# Patient Record
Sex: FEMALE | Race: WHITE | Employment: FULL TIME | URBAN - METROPOLITAN AREA
[De-identification: names, ages, dates, MRNs, and addresses within clinical notes are randomized per-mention and may not be internally consistent; named-entity substitution may affect disease eponyms.]

---

## 2023-10-04 ENCOUNTER — OFFICE VISIT (OUTPATIENT)
Age: 33
End: 2023-10-04
Payer: COMMERCIAL

## 2023-10-04 VITALS
DIASTOLIC BLOOD PRESSURE: 60 MMHG | TEMPERATURE: 97.6 F | SYSTOLIC BLOOD PRESSURE: 108 MMHG | HEART RATE: 74 BPM | RESPIRATION RATE: 20 BRPM | OXYGEN SATURATION: 98 % | BODY MASS INDEX: 20.8 KG/M2 | WEIGHT: 113 LBS | HEIGHT: 62 IN

## 2023-10-04 DIAGNOSIS — G43.719 INTRACTABLE CHRONIC MIGRAINE WITHOUT AURA AND WITHOUT STATUS MIGRAINOSUS: Primary | ICD-10-CM

## 2023-10-04 PROCEDURE — 99204 OFFICE O/P NEW MOD 45 MIN: CPT | Performed by: STUDENT IN AN ORGANIZED HEALTH CARE EDUCATION/TRAINING PROGRAM

## 2023-10-04 RX ORDER — BACLOFEN 5 MG/1
5 TABLET ORAL 2 TIMES DAILY
COMMUNITY
Start: 2023-09-12

## 2023-10-04 RX ORDER — NORGESTREL AND ETHINYL ESTRADIOL 0.3-0.03MG
KIT ORAL
COMMUNITY

## 2023-10-04 RX ORDER — RIZATRIPTAN BENZOATE 10 MG/1
TABLET ORAL
COMMUNITY
Start: 2023-02-03

## 2023-10-04 ASSESSMENT — ENCOUNTER SYMPTOMS
BACK PAIN: 0
SHORTNESS OF BREATH: 0
COUGH: 0
VOMITING: 0
NAUSEA: 1

## 2023-10-04 NOTE — PROGRESS NOTES
Margoth De La Rosa is a 29 y.o. female . presents for New Patient and Migraine    A 29years old female patient referred for evaluation of migraine and continuation of Botox injection. She used to get her Botox injection at [facility] neurology but her neurologist left the practice and referred here. Has been having headaches since around 2010. Had progressive increase in the frequency of her headache. Botox was helping. Used to have daily headaches; 5-8 severe headaches per month. But with the Botox injection, was getting 1-2 severe headaches per month. Her last Botox injection is more than 3 months ago; claims headaches are getting worse now. She described her headache as a bandlike pressure sensation starting from the frontal area then wraps around her head; then gradually involved this in the neck. Might be severe: 7-8/10. She also gets daily regular headaches which are mild. Has difficulty functioning when having the migraine-like headaches. Might have nausea and vomiting. Has photophobia but no phonophobia. Severe headaches might last for a couple of days. Takes rizatriptan which helps. She was also given baclofen which she takes as needed at nighttime; she claims it relaxes her. Had tried sumatriptan previously. Headache triggers include stress; no specific dietary triggers. She drinks alcohol over the weekends; mostly beer. It does not trigger headache. Clinical history includes mother with migraine headache with aura. Patient does not have any typical orders. Her past history of head trauma when she was in middle school: Used to do gymnastics and she fell from a balance beam; landed on her head. No loss of consciousness. She does not have any weakness of her extremities. No sensory symptoms over the upper or lower extremities. Other preventatives previously tried include Elavil. Was also on the blood pressure medicine for prevention but she does not remember the name.   MRI of the

## 2023-11-22 ENCOUNTER — PROCEDURE VISIT (OUTPATIENT)
Age: 33
End: 2023-11-22

## 2023-11-22 VITALS
DIASTOLIC BLOOD PRESSURE: 72 MMHG | SYSTOLIC BLOOD PRESSURE: 108 MMHG | TEMPERATURE: 98.3 F | RESPIRATION RATE: 20 BRPM | HEART RATE: 90 BPM | WEIGHT: 110.6 LBS | HEIGHT: 62 IN | BODY MASS INDEX: 20.35 KG/M2 | OXYGEN SATURATION: 98 %

## 2023-11-22 DIAGNOSIS — G43.719 INTRACTABLE CHRONIC MIGRAINE WITHOUT AURA AND WITHOUT STATUS MIGRAINOSUS: Primary | ICD-10-CM

## 2023-11-22 ASSESSMENT — ENCOUNTER SYMPTOMS
VOMITING: 0
BACK PAIN: 1
SHORTNESS OF BREATH: 0
COUGH: 0
NAUSEA: 1

## 2023-11-22 NOTE — PROGRESS NOTES
Leydi Friend is a 29 y.o. female . presents for Procedure    A 29years old female patient here for follow-up of headache and Botox injection. Her last Botox injection at [facility] neurology was around June or August.  Over the past few weeks, has daily headaches. Might disturb sleep. Mostly 3-4/10 in severity. Patient might get severe headaches about 1-2 times a month. Has occasional nausea but no vomiting. Has photophobia. No phonophobia. Maxalt helps for acute attacks. Sometimes takes ibuprofen. From initial encounter:  A 29years old female patient referred for evaluation of migraine and continuation of Botox injection. She used to get her Botox injection at [facility] neurology but her neurologist left the practice and referred here. Has been having headaches since around 2010. Had progressive increase in the frequency of her headache. Botox was helping. Used to have daily headaches; 5-8 severe headaches per month. But with the Botox injection, was getting 1-2 severe headaches per month. Her last Botox injection is more than 3 months ago; claims headaches are getting worse now. She described her headache as a bandlike pressure sensation starting from the frontal area then wraps around her head; then gradually involved this in the neck. Might be severe: 7-8/10. She also gets daily regular headaches which are mild. Has difficulty functioning when having the migraine-like headaches. Might have nausea and vomiting. Has photophobia but no phonophobia. Severe headaches might last for a couple of days. Takes rizatriptan which helps. She was also given baclofen which she takes as needed at nighttime; she claims it relaxes her. Had tried sumatriptan previously. Headache triggers include stress; no specific dietary triggers. She drinks alcohol over the weekends; mostly beer. It does not trigger headache. Clinical history includes mother with migraine headache with aura.   Patient does not

## 2024-02-22 ENCOUNTER — PROCEDURE VISIT (OUTPATIENT)
Age: 34
End: 2024-02-22

## 2024-02-22 VITALS
SYSTOLIC BLOOD PRESSURE: 120 MMHG | BODY MASS INDEX: 20.43 KG/M2 | RESPIRATION RATE: 18 BRPM | HEART RATE: 90 BPM | DIASTOLIC BLOOD PRESSURE: 78 MMHG | HEIGHT: 62 IN | WEIGHT: 111 LBS | TEMPERATURE: 98.3 F | OXYGEN SATURATION: 99 %

## 2024-02-22 DIAGNOSIS — G43.E09 CHRONIC MIGRAINE WITH AURA WITHOUT STATUS MIGRAINOSUS, NOT INTRACTABLE: Primary | ICD-10-CM

## 2024-02-22 NOTE — PROGRESS NOTES
Today of 2/22/2024, patient comes for botox.  She has migraine since college. It is at forehead, occipital radiating to bilateral shoulders. It is throbbing, stabbing, pounding, 2-9/10, lasting for several days. There are concurrent dizziness, photophobia, nausea, but no blurred vision, dysarthria. She has severe migraine attacks 3 times per month, and regular headache 3-4 times weekly. She failed multiple medications.     Botox Injection Procedure     Operative diagnosis:  Chronic migraine    Procedure: Chemical Neurolysis    Medications used: Botox    Fluid used to dilute: Normal saline    Lot number: [id number]    Expiration date: 2025/09    After risks and benefits were explained including bleeding, infection, worsening of pain, damage to the areas being injected, weakness of muscles, loss of muscle control, dysphagia if injecting the head or neck, facial droop if injecting the facial area, painful injection, allergic or other reaction to the medications being injected, and the failure of the procedure to help the problem, a signed consent was obtained.      The patient was placed in a comfortable area and the sites to be treated were identified.  The area to be treated was prepped three times with alcohol and the alcohol allowed to dry.  Next, 200 units were reconstituted with 2 ml of preservative free normal saline to make 10 units/ 0.1ml. 5~10 units were injected into the area to be treated, with or without EMG guidance.The remaining botox was thrown away.     Area injected: Temporallis muscle, Frontalis muscle, Occipitofrontalis muscle, Trapezius muscle, Semispinalis capitis muscle, and Stereocleidomastoid muscle     The patient tolerated procedure well. Pain was significantly relieved once procedure was done. There was no complication.

## 2024-03-01 ENCOUNTER — TELEPHONE (OUTPATIENT)
Age: 34
End: 2024-03-01

## 2024-08-21 ENCOUNTER — OFFICE VISIT (OUTPATIENT)
Age: 34
End: 2024-08-21

## 2024-08-21 VITALS
OXYGEN SATURATION: 97 % | WEIGHT: 111.4 LBS | SYSTOLIC BLOOD PRESSURE: 100 MMHG | TEMPERATURE: 98 F | BODY MASS INDEX: 20.5 KG/M2 | DIASTOLIC BLOOD PRESSURE: 68 MMHG | HEART RATE: 65 BPM | HEIGHT: 62 IN

## 2024-08-21 DIAGNOSIS — G43.E09 CHRONIC MIGRAINE WITH AURA WITHOUT STATUS MIGRAINOSUS, NOT INTRACTABLE: Primary | ICD-10-CM

## 2024-08-21 ASSESSMENT — ENCOUNTER SYMPTOMS
COUGH: 0
BACK PAIN: 1
NAUSEA: 0
SHORTNESS OF BREATH: 0
VOMITING: 0

## 2024-08-21 NOTE — PROGRESS NOTES
Catia Mcmanus MD, have performed the following reviews on Annette Arguello     prior to the start of the procedure:     * Patient was identified by name and date of birth   * Agreement on procedure being performed was verified  * Risks and Benefits explained to the patient  * Procedure site verified and marked as necessary  * Patient was positioned for comfort  * Consent was signed and verified     Time: 11:08 AM     Date of procedure: 08/21/24      Procedure performed by: Catia Mcmanus MD   Provider assisted by: None   Patient assisted by: self      How tolerated by patient: tolerated the procedure well with no complications         Lot Number [id number]  Expires 06/2026   Manufacture: [employer]  ND: 9116-1094-61  Dosage: 155 units     Wasted-45 units           PLEASE NOTE:   This document has been produced using voice recognition software. Unrecognized errors in transcription may be present.

## 2024-11-20 ENCOUNTER — OFFICE VISIT (OUTPATIENT)
Age: 34
End: 2024-11-20

## 2024-11-20 VITALS
WEIGHT: 116 LBS | TEMPERATURE: 97.9 F | OXYGEN SATURATION: 98 % | SYSTOLIC BLOOD PRESSURE: 117 MMHG | DIASTOLIC BLOOD PRESSURE: 80 MMHG | HEIGHT: 62 IN | HEART RATE: 85 BPM | BODY MASS INDEX: 21.35 KG/M2

## 2024-11-20 DIAGNOSIS — G43.E09 CHRONIC MIGRAINE WITH AURA WITHOUT STATUS MIGRAINOSUS, NOT INTRACTABLE: ICD-10-CM

## 2024-11-20 DIAGNOSIS — M54.2 NECK PAIN: Primary | ICD-10-CM

## 2024-11-20 RX ORDER — RIZATRIPTAN BENZOATE 10 MG/1
10 TABLET ORAL PRN
Qty: 10 TABLET | Refills: 3 | Status: SHIPPED | OUTPATIENT
Start: 2024-11-20

## 2024-11-20 RX ORDER — BACLOFEN 5 MG/1
5 TABLET ORAL 2 TIMES DAILY
Qty: 60 TABLET | Refills: 3 | Status: SHIPPED | OUTPATIENT
Start: 2024-11-20 | End: 2024-12-20

## 2024-11-20 ASSESSMENT — ENCOUNTER SYMPTOMS
COUGH: 0
NAUSEA: 0
VOMITING: 0
BACK PAIN: 1
SHORTNESS OF BREATH: 0

## 2024-11-20 NOTE — PROGRESS NOTES
Annette Arguello is a 34 y.o. female .presents for Procedure (Botox)    A 34 years old female patient here for follow-up of headache and Botox injection.  Last injection was in August 2024.  Headache has been better until last week.  For the past week, she has headaches almost every day.  Probably 1 severe migraine-like headache.  Mostly frontal; might have neck pain.  Throbbing, 2-3/10, and associated with occasional photophobia and mild phonophobia.  Does not affect her functioning.  No nausea or vomiting.  Only took Maxalt 1 time since the last visit.    From initial encounter:  A 33 years old female patient referred for evaluation of migraine and continuation of Botox injection.  She used to get her Botox injection at [facility] neurology but her neurologist left the practice and referred here.  Has been having headaches since around 2010.  Had progressive increase in the frequency of her headache.  Botox was helping.  Used to have daily headaches; 5-8 severe headaches per month.  But with the Botox injection, was getting 1-2 severe headaches per month.  Her last Botox injection is more than 3 months ago; claims headaches are getting worse now.  She described her headache as a bandlike pressure sensation starting from the frontal area then wraps around her head; then gradually involved this in the neck.  Might be severe: 7-8/10.  She also gets daily regular headaches which are mild.  Has difficulty functioning when having the migraine-like headaches.  Might have nausea and vomiting.  Has photophobia but no phonophobia.  Severe headaches might last for a couple of days.  Takes rizatriptan which helps.  She was also given baclofen which she takes as needed at nighttime; she claims it relaxes her.  Had tried sumatriptan previously.  Headache triggers include stress; no specific dietary triggers.  She drinks alcohol over the weekends; mostly beer.  It does not trigger headache.  Clinical history includes mother

## 2025-04-09 ENCOUNTER — OFFICE VISIT (OUTPATIENT)
Age: 35
End: 2025-04-09

## 2025-04-09 VITALS
HEART RATE: 78 BPM | OXYGEN SATURATION: 99 % | WEIGHT: 112.8 LBS | SYSTOLIC BLOOD PRESSURE: 104 MMHG | HEIGHT: 62 IN | TEMPERATURE: 97.6 F | DIASTOLIC BLOOD PRESSURE: 82 MMHG | RESPIRATION RATE: 18 BRPM | BODY MASS INDEX: 20.76 KG/M2

## 2025-04-09 DIAGNOSIS — G43.E09 CHRONIC MIGRAINE WITH AURA WITHOUT STATUS MIGRAINOSUS, NOT INTRACTABLE: Primary | ICD-10-CM

## 2025-04-09 RX ORDER — BACLOFEN 5 MG/1
5 TABLET ORAL 2 TIMES DAILY
COMMUNITY
Start: 2025-01-23

## 2025-04-09 RX ORDER — RIZATRIPTAN BENZOATE 10 MG/1
10 TABLET ORAL PRN
Qty: 10 TABLET | Refills: 5 | Status: SHIPPED | OUTPATIENT
Start: 2025-04-09

## 2025-04-09 ASSESSMENT — ENCOUNTER SYMPTOMS
SHORTNESS OF BREATH: 0
NAUSEA: 0
COUGH: 0
VOMITING: 0
BACK PAIN: 1

## 2025-04-09 NOTE — PROGRESS NOTES
sites  Occipitalis............................    30 units divided in to 6 sites  Cervical paraspinals............   20 units divided in to 4 sites  Trapezius..............................   30 units divided in to 6 sites            The procedure was tolerated  well with no complications      45 Units wasted.      [facility]  OFFICE PROCEDURE PROGRESS NOTE           Chart reviewed for the following:               Catia DE LA TORRE MD, have reviewed the History, Physical and updated the Allergic reactions for Annette A Silliphant    TIME OUT performed immediately prior to start of procedure:               Catia DE LA TORRE MD, have performed the following reviews on Annette A Silliphant     prior to the start of the procedure:     * Patient was identified by name and date of birth   * Agreement on procedure being performed was verified  * Risks and Benefits explained to the patient  * Procedure site verified and marked as necessary  * Patient was positioned for comfort  * Consent was signed and verified     Time: 10:00 AM     Date of procedure: 04/09/25      Procedure performed by: Catia Mcmanus MD   Provider assisted by: None   Patient assisted by: self      How tolerated by patient: tolerated the procedure well with no complications         Lot Number [id number]  Expires 03/2027   Manufacture: [employer]  NDC: 5659-4352-22  Dosage: 155 units     Wasted-45 units           PLEASE NOTE:   This document has been produced using voice recognition software. Unrecognized errors in transcription may be present.

## 2025-07-02 ENCOUNTER — OFFICE VISIT (OUTPATIENT)
Age: 35
End: 2025-07-02

## 2025-07-02 VITALS
TEMPERATURE: 97.2 F | HEART RATE: 78 BPM | DIASTOLIC BLOOD PRESSURE: 75 MMHG | WEIGHT: 112 LBS | BODY MASS INDEX: 20.61 KG/M2 | OXYGEN SATURATION: 97 % | SYSTOLIC BLOOD PRESSURE: 106 MMHG | HEIGHT: 62 IN

## 2025-07-02 DIAGNOSIS — G43.E09 CHRONIC MIGRAINE WITH AURA WITHOUT STATUS MIGRAINOSUS, NOT INTRACTABLE: Primary | ICD-10-CM

## 2025-07-02 ASSESSMENT — ENCOUNTER SYMPTOMS
BACK PAIN: 1
COUGH: 0
NAUSEA: 0
VOMITING: 0
SHORTNESS OF BREATH: 0

## 2025-07-02 NOTE — PROGRESS NOTES
Annette Arguello is a 35 y.o. female .presents for Follow-up (BOTOX/)    A 35 years old female patient here for follow-up of headache and Botox injection.  Last injection was in April 2025.  She has headaches every day; but not very intense.  No recent severe migraine-like headaches.  Able to function.  No associated nausea or vomiting; no photophobia or phonophobia.  Only took rizatriptan a couple of times over the past 2 months.    From initial encounter:  A 33 years old female patient referred for evaluation of migraine and continuation of Botox injection.  She used to get her Botox injection at [facility] neurology but her neurologist left the practice and referred here.  Has been having headaches since around 2010.  Had progressive increase in the frequency of her headache.  Botox was helping.  Used to have daily headaches; 5-8 severe headaches per month.  But with the Botox injection, was getting 1-2 severe headaches per month.  Her last Botox injection is more than 3 months ago; claims headaches are getting worse now.  She described her headache as a bandlike pressure sensation starting from the frontal area then wraps around her head; then gradually involved this in the neck.  Might be severe: 7-8/10.  She also gets daily regular headaches which are mild.  Has difficulty functioning when having the migraine-like headaches.  Might have nausea and vomiting.  Has photophobia but no phonophobia.  Severe headaches might last for a couple of days.  Takes rizatriptan which helps.  She was also given baclofen which she takes as needed at nighttime; she claims it relaxes her.  Had tried sumatriptan previously.  Headache triggers include stress; no specific dietary triggers.  She drinks alcohol over the weekends; mostly beer.  It does not trigger headache.  Clinical history includes mother with migraine headache with aura.  Patient does not have any typical orders.  Her past history of head trauma when she was in